# Patient Record
Sex: FEMALE | Race: WHITE | NOT HISPANIC OR LATINO
[De-identification: names, ages, dates, MRNs, and addresses within clinical notes are randomized per-mention and may not be internally consistent; named-entity substitution may affect disease eponyms.]

---

## 2019-05-14 PROBLEM — Z00.00 ENCOUNTER FOR PREVENTIVE HEALTH EXAMINATION: Status: ACTIVE | Noted: 2019-05-14

## 2019-08-02 ENCOUNTER — APPOINTMENT (OUTPATIENT)
Dept: OBGYN | Facility: CLINIC | Age: 24
End: 2019-08-02

## 2019-08-09 ENCOUNTER — APPOINTMENT (OUTPATIENT)
Dept: GASTROENTEROLOGY | Facility: CLINIC | Age: 24
End: 2019-08-09
Payer: COMMERCIAL

## 2019-08-09 VITALS
SYSTOLIC BLOOD PRESSURE: 109 MMHG | DIASTOLIC BLOOD PRESSURE: 68 MMHG | OXYGEN SATURATION: 99 % | HEART RATE: 58 BPM | BODY MASS INDEX: 24.8 KG/M2 | WEIGHT: 140 LBS | HEIGHT: 63 IN

## 2019-08-09 DIAGNOSIS — K58.9 IRRITABLE BOWEL SYNDROME W/OUT DIARRHEA: ICD-10-CM

## 2019-08-09 DIAGNOSIS — K61.0 ANAL ABSCESS: ICD-10-CM

## 2019-08-09 DIAGNOSIS — K60.3 ANAL FISTULA: ICD-10-CM

## 2019-08-09 DIAGNOSIS — M25.50 PAIN IN UNSPECIFIED JOINT: ICD-10-CM

## 2019-08-09 PROCEDURE — 99204 OFFICE O/P NEW MOD 45 MIN: CPT

## 2019-08-09 NOTE — HISTORY OF PRESENT ILLNESS
[FreeTextEntry1] : 1.  this is a [age] year old patient..\par patient had a history of soft stools, going back approx three years..\par even in high school, had some joint pains, and some diarrhea,  went on gluten free and joint pain and swelling improved.\par \par in college, back on a normal reg diet,\par and approx three years ago..first swellling, then some joint pains, and left sided abdom pain, and possibly change in stool, but not sure\par \par as a rule there are three stools per day..and stools are always soft..\par \par the pain is sharp and shooting, but with a gluten free, which was institued approx three years ago, she noted that the pain got better, the swelling got better, the flatulence got better,  \par but the stools stayed somewhat loose..\par but her stools have remained soft perhaps because of a high fiber intake..\par \par a hakeem anal abscess developed, and followed a viral infection with vomiting and diarrhea one month earlier..\par went to a surgeon in [place] first dx in [facility]\par it was lanced in the doctor's office..\par \par and Eun was offered an antibiotic, but she had already taken an antibiotic, penic based..\par \par after the drainage procedure, there was residual pain, and ultimately when patient was back here, she saw Dr Kenneth Cope.., who examined the patient, and he recognized that there was a residual fisula.\par \par He placed a seton through the fistula..and he wanted to make sure it would heal optimally, and subsequently it fell out.\par \par It has apparently healed completely.

## 2019-08-09 NOTE — ASSESSMENT
[FreeTextEntry1] : 1.  patient is having long standing symptoms, mentioned above, that included sharp left mid abdom discomfort, and also joint pains which improved\par and also a tendency to soft but formed occas on the loose side, sometimes watery stool\par \par 2.  then, recently , she developed a hakeem anals absess\par \par 3.  she has had responsiveness to gluten free diets in the past in terms of joint discomforts, and left sided pain, but the stools did not change appreciably\par \par 4.  although abnormal bowel movements have been fairly consistent, they never reached a severity more than two or three per day, and again, no pain, no blood, no nocturnal stools\par \par 5.  we need to get a dfecal calprotectin done if it was not previously done\par \par 6.  we need to do celiac genotype, to give us more infor about whether this could actually be celiac disease vs  gluten intolerance\par \par 7.  I am suggesting to Eun that the type of diarrhea and course so far have not been characteristic of Crohns ; but at some point we probably should do a colonoscopy.\par \par More than 50% of the face to face time was devoted to counseling and /or coordination of care.  THis coordination of care may have included reviewing other medical notes and reports, and communicating with other health professionals\par \par \par 5.  gluten sensitivity is different than celiac disease, and isnt easily tested for.\par \par 6.  sister has hashimotos thyroiditis..\par \par 7.  gluten sensitivity, poorly understood condition, means that people when exposed to gluten may get diarrhea, may get abdominal bloating, may occas get other auto immune phenomenon, but they actually do not get true inflamm in their small intestine\par \par \par 8.  we used to think that the only way to test for celiac disease;  is the ab..

## 2019-08-09 NOTE — PHYSICAL EXAM
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

## 2019-08-14 ENCOUNTER — APPOINTMENT (OUTPATIENT)
Dept: GASTROENTEROLOGY | Facility: CLINIC | Age: 24
End: 2019-08-14
Payer: COMMERCIAL

## 2019-08-14 DIAGNOSIS — K52.9 NONINFECTIVE GASTROENTERITIS AND COLITIS, UNSPECIFIED: ICD-10-CM

## 2019-08-14 DIAGNOSIS — K90.41 NON-CELIAC GLUTEN SENSITIVITY: ICD-10-CM

## 2019-08-14 PROCEDURE — 36415 COLL VENOUS BLD VENIPUNCTURE: CPT

## 2019-08-15 ENCOUNTER — RECORD ABSTRACTING (OUTPATIENT)
Age: 24
End: 2019-08-15

## 2019-08-15 DIAGNOSIS — Z80.0 FAMILY HISTORY OF MALIGNANT NEOPLASM OF DIGESTIVE ORGANS: ICD-10-CM

## 2019-08-15 DIAGNOSIS — Z72.89 OTHER PROBLEMS RELATED TO LIFESTYLE: ICD-10-CM

## 2019-08-15 DIAGNOSIS — Z82.0 FAMILY HISTORY OF EPILEPSY AND OTHER DISEASES OF THE NERVOUS SYSTEM: ICD-10-CM

## 2019-08-15 DIAGNOSIS — E03.9 HYPOTHYROIDISM, UNSPECIFIED: ICD-10-CM

## 2019-08-15 RX ORDER — NORGESTIMATE AND ETHINYL ESTRADIOL 7DAYSX3 LO
0.18/0.215/0.25 KIT ORAL
Refills: 0 | Status: ACTIVE | COMMUNITY

## 2019-08-15 RX ORDER — METFORMIN HYDROCHLORIDE 500 MG/1
500 TABLET, COATED ORAL
Refills: 0 | Status: ACTIVE | COMMUNITY

## 2019-08-15 RX ORDER — NORETHINDRONE ACETATE AND ETHINYL ESTRADIOL 1.5; 3 MG/1; UG/1
1.5-3 TABLET ORAL
Refills: 0 | Status: ACTIVE | COMMUNITY

## 2019-08-15 RX ORDER — LIOTHYRONINE SODIUM 25 UG/1
25 TABLET ORAL
Refills: 0 | Status: ACTIVE | COMMUNITY

## 2019-08-16 LAB
IGA SER QL IEP: 163 MG/DL
TTG IGA SER IA-ACNC: <1.2 U/ML
TTG IGA SER-ACNC: NEGATIVE
TTG IGG SER IA-ACNC: 1.4 U/ML
TTG IGG SER IA-ACNC: NEGATIVE

## 2019-08-20 LAB
CALPROTECTIN FECAL: <16 UG/G
ENDOMYSIUM IGA SER QL: NEGATIVE
ENDOMYSIUM IGA TITR SER: NORMAL